# Patient Record
Sex: FEMALE | Race: WHITE | NOT HISPANIC OR LATINO | URBAN - METROPOLITAN AREA
[De-identification: names, ages, dates, MRNs, and addresses within clinical notes are randomized per-mention and may not be internally consistent; named-entity substitution may affect disease eponyms.]

---
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
## 2022-02-22 ENCOUNTER — TRANSCRIPTION ENCOUNTER (OUTPATIENT)
Age: 22
End: 2022-02-22

## 2022-05-24 ENCOUNTER — NON-APPOINTMENT (OUTPATIENT)
Age: 22
End: 2022-05-24

## 2022-05-26 ENCOUNTER — NON-APPOINTMENT (OUTPATIENT)
Age: 22
End: 2022-05-26

## 2022-06-03 ENCOUNTER — NON-APPOINTMENT (OUTPATIENT)
Age: 22
End: 2022-06-03

## 2022-10-24 ENCOUNTER — NON-APPOINTMENT (OUTPATIENT)
Age: 22
End: 2022-10-24

## 2023-02-07 PROBLEM — Z00.00 ENCOUNTER FOR PREVENTIVE HEALTH EXAMINATION: Status: ACTIVE | Noted: 2023-02-07

## 2023-02-07 PROBLEM — R73.03 PREDIABETES: Chronic | Status: ACTIVE | Noted: 2017-09-20

## 2023-02-07 PROBLEM — E28.2 POLYCYSTIC OVARIAN SYNDROME: Chronic | Status: ACTIVE | Noted: 2017-09-20

## 2023-03-07 ENCOUNTER — NON-APPOINTMENT (OUTPATIENT)
Age: 23
End: 2023-03-07

## 2023-03-07 ENCOUNTER — APPOINTMENT (OUTPATIENT)
Dept: NEUROLOGY | Facility: CLINIC | Age: 23
End: 2023-03-07
Payer: COMMERCIAL

## 2023-03-07 VITALS
SYSTOLIC BLOOD PRESSURE: 122 MMHG | HEIGHT: 67 IN | DIASTOLIC BLOOD PRESSURE: 88 MMHG | WEIGHT: 280 LBS | BODY MASS INDEX: 43.95 KG/M2 | HEART RATE: 87 BPM | TEMPERATURE: 97.8 F

## 2023-03-07 DIAGNOSIS — G43.701 CHRONIC MIGRAINE W/OUT AURA, NOT INTRACTABLE, WITH STATUS MIGRAINOSUS: ICD-10-CM

## 2023-03-07 DIAGNOSIS — Z82.0 FAMILY HISTORY OF EPILEPSY AND OTHER DISEASES OF THE NERVOUS SYSTEM: ICD-10-CM

## 2023-03-07 DIAGNOSIS — R51.9 HEADACHE, UNSPECIFIED: ICD-10-CM

## 2023-03-07 PROCEDURE — 99204 OFFICE O/P NEW MOD 45 MIN: CPT

## 2023-03-07 RX ORDER — BUPROPION HYDROCHLORIDE 200 MG/1
200 TABLET, FILM COATED, EXTENDED RELEASE ORAL TWICE DAILY
Refills: 0 | Status: ACTIVE | COMMUNITY

## 2023-03-07 RX ORDER — SERTRALINE HYDROCHLORIDE 50 MG/1
50 TABLET, FILM COATED ORAL DAILY
Refills: 0 | Status: ACTIVE | COMMUNITY

## 2023-03-07 RX ORDER — SUMATRIPTAN 50 MG/1
50 TABLET, FILM COATED ORAL
Refills: 0 | Status: ACTIVE | COMMUNITY

## 2023-03-07 NOTE — HISTORY OF PRESENT ILLNESS
[FreeTextEntry1] : 23-year-old woman, right-handed without significant medical history, complaining of recurrent headaches.  The headaches are described as moderate to severe intensity, usually bilateral temporal areas, starts with pressure can be throbbing, can occur at night can occur during the day can wake her up from sleep.  No clear association with his menstrual cycle, instead of mild, hunger time of day, weather pattern.  Over the last month and a half the headaches have become more frequent now almost daily.  Presently prescribed sumatriptan 50 mg which usually partial relief.  In the past has tried Tylenol, ibuprofen, naproxen which is probably helpful.\par No history of head injury or trauma, no associated symptoms of loss of vision double vision, confusion disorientation, trouble speaking, unilateral weakness or numbness of the face or extremities.\par No history of high blood pressure, heart disease, lung disease or malignancy.\par Reports 1 sister younger than her, with severe migraines, has tried multiple preventative as well as rescue therapies.  Presently on Aimovig and Botox. [Chronic Headache] : chronic headache [Aura] : no aura [Dizziness] : dizziness [Nausea] : nausea [Photophobia] : photophobia [Phonophobia] : phonophobia [Neck Pain] : neck pain [Scotoma] : no scotoma [Numbness] : no numbness [Tingling] : no tingling [Weakness] : no weakness [> 15 days per month] : > 15 days per month [> 4 hours] : > 4 hours [stayed the same] : stayed the same [5] : a current pain level of 5/10 [3] : a minimum pain level of 3/10 [9] : a maximum pain level of 9/10 [Increased] : Overall, patient's activity level has increased [Worsened] : The patient reports ~his/her~ symptoms since the last visit have worsened

## 2023-03-07 NOTE — PHYSICAL EXAM
[General Appearance - Alert] : alert [General Appearance - In No Acute Distress] : in no acute distress [Oriented To Time, Place, And Person] : oriented to person, place, and time [Impaired Insight] : insight and judgment were intact [Affect] : the affect was normal [Person] : oriented to person [Place] : oriented to place [Time] : oriented to time [Concentration Intact] : normal concentrating ability [Visual Intact] : visual attention was ~T not ~L decreased [Naming Objects] : no difficulty naming common objects [Repeating Phrases] : no difficulty repeating a phrase [Writing A Sentence] : no difficulty writing a sentence [Fluency] : fluency intact [Comprehension] : comprehension intact [Reading] : reading intact [Past History] : adequate knowledge of personal past history [Cranial Nerves Optic (II)] : visual acuity intact bilaterally,  visual fields full to confrontation, pupils equal round and reactive to light [Cranial Nerves Oculomotor (III)] : extraocular motion intact [Cranial Nerves Trigeminal (V)] : facial sensation intact symmetrically [Cranial Nerves Facial (VII)] : face symmetrical [Cranial Nerves Vestibulocochlear (VIII)] : hearing was intact bilaterally [Cranial Nerves Glossopharyngeal (IX)] : tongue and palate midline [Cranial Nerves Accessory (XI - Cranial And Spinal)] : head turning and shoulder shrug symmetric [Cranial Nerves Hypoglossal (XII)] : there was no tongue deviation with protrusion [Motor Tone] : muscle tone was normal in all four extremities [Motor Strength] : muscle strength was normal in all four extremities [No Muscle Atrophy] : normal bulk in all four extremities [Motor Handedness Right-Handed] : the patient is right hand dominant [Paresis Pronator Drift Right-Sided] : no pronator drift on the right [Paresis Pronator Drift Left-Sided] : no pronator drift on the left [Sensation Tactile Decrease] : light touch was intact [Abnormal Walk] : normal gait [Balance] : balance was intact [Past-pointing] : there was no past-pointing [Tremor] : no tremor present [Dysdiadochokinesia Bilaterally] : not present [Coordination - Dysmetria Impaired Finger-to-Nose Bilateral] : not present [2+] : Ankle jerk left 2+ [Sclera] : the sclera and conjunctiva were normal [PERRL With Normal Accommodation] : pupils were equal in size, round, reactive to light, with normal accommodation [Extraocular Movements] : extraocular movements were intact [Full Visual Field] : full visual field [Outer Ear] : the ears and nose were normal in appearance [Hearing Threshold Finger Rub Not Swain] : hearing was normal [Neck Appearance] : the appearance of the neck was normal [] : the neck was supple [Neck Cervical Mass (___cm)] : no neck mass was observed [Arterial Pulses Carotid] : carotid pulses were normal with no bruits

## 2023-03-16 ENCOUNTER — APPOINTMENT (OUTPATIENT)
Dept: NEUROLOGY | Facility: CLINIC | Age: 23
End: 2023-03-16
Payer: COMMERCIAL

## 2023-03-16 PROCEDURE — 95816 EEG AWAKE AND DROWSY: CPT

## 2023-03-27 ENCOUNTER — APPOINTMENT (OUTPATIENT)
Dept: MRI IMAGING | Facility: CLINIC | Age: 23
End: 2023-03-27
Payer: COMMERCIAL

## 2023-03-27 ENCOUNTER — OUTPATIENT (OUTPATIENT)
Dept: OUTPATIENT SERVICES | Facility: HOSPITAL | Age: 23
LOS: 1 days | End: 2023-03-27
Payer: COMMERCIAL

## 2023-03-27 DIAGNOSIS — R51.9 HEADACHE, UNSPECIFIED: ICD-10-CM

## 2023-03-27 PROCEDURE — A9585: CPT

## 2023-03-27 PROCEDURE — 70553 MRI BRAIN STEM W/O & W/DYE: CPT

## 2023-03-27 PROCEDURE — 70553 MRI BRAIN STEM W/O & W/DYE: CPT | Mod: 26

## 2023-03-28 ENCOUNTER — NON-APPOINTMENT (OUTPATIENT)
Age: 23
End: 2023-03-28

## 2023-05-15 ENCOUNTER — RX RENEWAL (OUTPATIENT)
Age: 23
End: 2023-05-15

## 2023-09-19 ENCOUNTER — APPOINTMENT (OUTPATIENT)
Dept: NEUROLOGY | Facility: CLINIC | Age: 23
End: 2023-09-19

## 2023-11-14 ENCOUNTER — RX RENEWAL (OUTPATIENT)
Age: 23
End: 2023-11-14

## 2023-11-18 ENCOUNTER — NON-APPOINTMENT (OUTPATIENT)
Age: 23
End: 2023-11-18

## 2023-12-10 ENCOUNTER — NON-APPOINTMENT (OUTPATIENT)
Age: 23
End: 2023-12-10

## 2023-12-14 ENCOUNTER — RX RENEWAL (OUTPATIENT)
Age: 23
End: 2023-12-14

## 2024-01-12 ENCOUNTER — RX RENEWAL (OUTPATIENT)
Age: 24
End: 2024-01-12

## 2024-01-26 ENCOUNTER — APPOINTMENT (OUTPATIENT)
Dept: NEUROLOGY | Facility: CLINIC | Age: 24
End: 2024-01-26
Payer: COMMERCIAL

## 2024-01-26 VITALS
HEIGHT: 67 IN | DIASTOLIC BLOOD PRESSURE: 85 MMHG | BODY MASS INDEX: 44.73 KG/M2 | WEIGHT: 285 LBS | TEMPERATURE: 97.6 F | SYSTOLIC BLOOD PRESSURE: 138 MMHG | HEART RATE: 85 BPM

## 2024-01-26 PROCEDURE — G2211 COMPLEX E/M VISIT ADD ON: CPT

## 2024-01-26 PROCEDURE — 99214 OFFICE O/P EST MOD 30 MIN: CPT

## 2024-01-26 RX ORDER — VERAPAMIL HYDROCHLORIDE 100 MG/1
100 CAPSULE, EXTENDED RELEASE ORAL
Qty: 30 | Refills: 5 | Status: ACTIVE | COMMUNITY
Start: 2023-03-07 | End: 1900-01-01

## 2024-01-26 RX ORDER — SUMATRIPTAN 100 MG/1
100 TABLET, FILM COATED ORAL
Qty: 12 | Refills: 5 | Status: ACTIVE | COMMUNITY
Start: 2023-03-07 | End: 1900-01-01

## 2024-01-26 NOTE — DISCUSSION/SUMMARY
[FreeTextEntry1] : 24-year-old woman history of chronic migraines, excellent response to verapamil  mg daily. Sumatriptan 100 mg, continue to be very effective in relieving her pain. Reviewed and discussed treatment, no change at this time. Patient will call with any changes. Return to office, 6 months.

## 2024-01-26 NOTE — HISTORY OF PRESENT ILLNESS
[FreeTextEntry1] : 24-year-old woman right-handed history of migraine headaches, here for follow-up.  Last seen March 2023.  At that time started on verapamil  mg at night, also provided sumatriptan 100 mg p.o. as needed headache. She has noticed significant reduction of her headaches.  At the time we saw her, she was having around 15 or more headaches a month, now since she has been on verapamil, she notes about 1 or 2 headaches a month.  Usually triggered by stress, weather patterns. Denies any difficulty verapamil, no dizziness, no chest pain or palpitation.  No constipation, no mood changes. The sumatriptan 100 mg usually works quite well for relief of her pain, without any side effects.

## 2024-01-26 NOTE — DATA REVIEWED
[de-identified] :   	 EXAM: [id number] - MR BRAIN WAW IC  - ORDERED BY: TYE WHITE   PROCEDURE DATE:  03/27/2023    INTERPRETATION:  CLINICAL INDICATIONS: worsening headaches, r/o SOL;  COMPARISON: None.  TECHNIQUE: MRI brain: Multiplanar, multisequence MR imaging of the brain are obtained with and without the administration of 12.5 cc intravenous Gadavist contrast. 2.5 cc of contrast was discarded  FINDINGS: Visualized internal auditory canals are unremarkable. Cranial nerves VII and VIII are unremarkable. The vestibulocochlear complexes are symmetric. No IAC mass. No abnormal leptomeningeal or parenchymal enhancement. Consider CT of the temporal bones as clinically warranted. There is no abnormal restricted diffusion to suggest acute infarction. No abnormal signal is demonstrated throughout the brain parenchyma. Normal T2 flow-voids are seen within  the intracranial vasculature. The lateral ventricles and cortical sulci are age-appropriate in size and configuration. There is no mass, mass effect, or extra-axial fluid collection. There is no susceptibility artifact to suggest hemorrhage. Midline structures are normal.  The visualized paranasal sinuses, mastoid air cells and orbits are unremarkable.   IMPRESSION: Unremarkable MRI of the brain and IACs.  [de-identified] :     Normal awake and drowsy EEG.    Signatures      Electronically signed by : TYE WHITE MD; Mar 21 2023  3:48PM EST (Author)

## 2024-07-23 ENCOUNTER — APPOINTMENT (OUTPATIENT)
Dept: NEUROLOGY | Facility: CLINIC | Age: 24
End: 2024-07-23

## 2024-08-20 ENCOUNTER — NON-APPOINTMENT (OUTPATIENT)
Age: 24
End: 2024-08-20

## 2024-08-20 ENCOUNTER — APPOINTMENT (OUTPATIENT)
Dept: FAMILY MEDICINE | Facility: CLINIC | Age: 24
End: 2024-08-20
Payer: COMMERCIAL

## 2024-08-20 VITALS
HEIGHT: 67 IN | TEMPERATURE: 97.9 F | BODY MASS INDEX: 45.99 KG/M2 | DIASTOLIC BLOOD PRESSURE: 80 MMHG | SYSTOLIC BLOOD PRESSURE: 120 MMHG | OXYGEN SATURATION: 98 % | HEART RATE: 81 BPM | WEIGHT: 293 LBS

## 2024-08-20 PROCEDURE — 99204 OFFICE O/P NEW MOD 45 MIN: CPT

## 2024-08-20 NOTE — HISTORY OF PRESENT ILLNESS
[FreeTextEntry8] : JOSE M MONIQUE is a 24 year old female presenting to [facility] care.

## 2024-08-28 ENCOUNTER — APPOINTMENT (OUTPATIENT)
Dept: FAMILY MEDICINE | Facility: CLINIC | Age: 24
End: 2024-08-28
Payer: COMMERCIAL

## 2024-08-28 VITALS
HEART RATE: 75 BPM | TEMPERATURE: 97.7 F | OXYGEN SATURATION: 96 % | HEIGHT: 67 IN | BODY MASS INDEX: 45.99 KG/M2 | SYSTOLIC BLOOD PRESSURE: 110 MMHG | WEIGHT: 293 LBS | DIASTOLIC BLOOD PRESSURE: 72 MMHG

## 2024-08-28 DIAGNOSIS — G43.701 CHRONIC MIGRAINE W/OUT AURA, NOT INTRACTABLE, WITH STATUS MIGRAINOSUS: ICD-10-CM

## 2024-08-28 DIAGNOSIS — Z00.00 ENCOUNTER FOR GENERAL ADULT MEDICAL EXAMINATION W/OUT ABNORMAL FINDINGS: ICD-10-CM

## 2024-08-28 DIAGNOSIS — Z23 ENCOUNTER FOR IMMUNIZATION: ICD-10-CM

## 2024-08-28 DIAGNOSIS — F41.9 ANXIETY DISORDER, UNSPECIFIED: ICD-10-CM

## 2024-08-28 DIAGNOSIS — F32.A ANXIETY DISORDER, UNSPECIFIED: ICD-10-CM

## 2024-08-28 PROCEDURE — 90686 IIV4 VACC NO PRSV 0.5 ML IM: CPT

## 2024-08-28 PROCEDURE — 92552 PURE TONE AUDIOMETRY AIR: CPT

## 2024-08-28 PROCEDURE — G0008: CPT

## 2024-08-28 PROCEDURE — 99395 PREV VISIT EST AGE 18-39: CPT | Mod: 25

## 2024-08-28 PROCEDURE — 36415 COLL VENOUS BLD VENIPUNCTURE: CPT

## 2024-08-28 PROCEDURE — 81003 URINALYSIS AUTO W/O SCOPE: CPT | Mod: QW

## 2024-08-28 NOTE — PHYSICAL EXAM
[No Acute Distress] : no acute distress [Well Nourished] : well nourished [Well Developed] : well developed [Well-Appearing] : well-appearing [Normal Sclera/Conjunctiva] : normal sclera/conjunctiva [PERRL] : pupils equal round and reactive to light [EOMI] : extraocular movements intact [Normal Outer Ear/Nose] : the outer ears and nose were normal in appearance [Normal Oropharynx] : the oropharynx was normal [No JVD] : no jugular venous distention [No Lymphadenopathy] : no lymphadenopathy [Supple] : supple [Thyroid Normal, No Nodules] : the thyroid was normal and there were no nodules present [No Respiratory Distress] : no respiratory distress  [No Accessory Muscle Use] : no accessory muscle use [Clear to Auscultation] : lungs were clear to auscultation bilaterally [Normal Rate] : normal rate  [Regular Rhythm] : with a regular rhythm [Normal S1, S2] : normal S1 and S2 [No Murmur] : no murmur heard [No Carotid Bruits] : no carotid bruits [No Abdominal Bruit] : a ~M bruit was not heard ~T in the abdomen [No Varicosities] : no varicosities [Pedal Pulses Present] : the pedal pulses are present [No Edema] : there was no peripheral edema [No Palpable Aorta] : no palpable aorta [No Extremity Clubbing/Cyanosis] : no extremity clubbing/cyanosis [Soft] : abdomen soft [Non Tender] : non-tender [Non-distended] : non-distended [No Masses] : no abdominal mass palpated [No HSM] : no HSM [Normal Bowel Sounds] : normal bowel sounds [Normal Posterior Cervical Nodes] : no posterior cervical lymphadenopathy [Normal Anterior Cervical Nodes] : no anterior cervical lymphadenopathy [No CVA Tenderness] : no CVA  tenderness [No Spinal Tenderness] : no spinal tenderness [No Joint Swelling] : no joint swelling [Grossly Normal Strength/Tone] : grossly normal strength/tone [No Rash] : no rash [Coordination Grossly Intact] : coordination grossly intact [No Focal Deficits] : no focal deficits [Normal Gait] : normal gait [Deep Tendon Reflexes (DTR)] : deep tendon reflexes were 2+ and symmetric [Normal Affect] : the affect was normal [Normal Insight/Judgement] : insight and judgment were intact [FreeTextEntry1] : . Right Ear  0.5-50 1-30 2-25 4-20  Left Ear  0.5-45 1-30 2-20 4-20

## 2024-08-28 NOTE — HEALTH RISK ASSESSMENT
[No] : No [Monthly or less (1 pt)] : Monthly or less (1 point) [1 or 2 (0 pts)] : 1 or 2 (0 points) [Never (0 pts)] : Never (0 points) [Yes] : In the past 12 months have you used drugs other than those required for medical reasons? Yes [No falls in past year] : Patient reported no falls in the past year [0] : 1) Little interest or pleasure doing things: Not at all (0) [1] : 2) Feeling down, depressed, or hopeless for several days (1) [PHQ-2 Negative - No further assessment needed] : PHQ-2 Negative - No further assessment needed [Never] : Never [NO] : No [Patient reported PAP Smear was normal] : Patient reported PAP Smear was normal [HIV test declined] : HIV test declined [Hepatitis C test declined] : Hepatitis C test declined [With Family] : lives with family [# of Members in Household ___] :  household currently consist of [unfilled] member(s) [Employed] : employed [College] : College [Single] : single [Sexually Active] : sexually active [Feels Safe at Home] : Feels safe at home [Fully functional (bathing, dressing, toileting, transferring, walking, feeding)] : Fully functional (bathing, dressing, toileting, transferring, walking, feeding) [Fully functional (using the telephone, shopping, preparing meals, housekeeping, doing laundry, using] : Fully functional and needs no help or supervision to perform IADLs (using the telephone, shopping, preparing meals, housekeeping, doing laundry, using transportation, managing medications and managing finances) [Reports normal functional visual acuity (ie: able to read med bottle)] : Reports normal functional visual acuity [Smoke Detector] : smoke detector [Carbon Monoxide Detector] : carbon monoxide detector [Seat Belt] :  uses seat belt [Sunscreen] : uses sunscreen [Travel to Developing Areas] : travel to developing areas [Audit-CScore] : 1 [de-identified] : marijuana  [[id number]] : 1 [Change in mental status noted] : No change in mental status noted [Language] : denies difficulty with language [Behavior] : denies difficulty with behavior [Learning/Retaining New Information] : denies difficulty learning/retaining new information [Handling Complex Tasks] : denies difficulty handling complex tasks [Reasoning] : denies difficulty with reasoning [Spatial Ability and Orientation] : denies difficulty with spatial ability and orientation [Reports changes in hearing] : Reports no changes in hearing [Reports changes in vision] : Reports no changes in vision [Reports changes in dental health] : Reports no changes in dental health [TB Exposure] : is not being exposed to tuberculosis [Caregiver Concerns] : does not have caregiver concerns [PapSmearDate] : 06/23 [FreeTextEntry2] :

## 2024-09-01 LAB
25(OH)D3 SERPL-MCNC: 40 NG/ML
ALBUMIN SERPL ELPH-MCNC: 4.2 G/DL
ALP BLD-CCNC: 117 U/L
ALT SERPL-CCNC: 19 U/L
ANION GAP SERPL CALC-SCNC: 14 MMOL/L
APPEARANCE: CLEAR
AST SERPL-CCNC: 16 U/L
BACTERIA: ABNORMAL /HPF
BILIRUB SERPL-MCNC: 0.7 MG/DL
BILIRUB UR QL STRIP: NORMAL
BILIRUBIN URINE: NEGATIVE
BLOOD URINE: NEGATIVE
BUN SERPL-MCNC: 15 MG/DL
CALCIUM SERPL-MCNC: 9.4 MG/DL
CAST: 0 /LPF
CHLORIDE SERPL-SCNC: 102 MMOL/L
CHOLEST SERPL-MCNC: 173 MG/DL
CLARITY UR: CLEAR
CO2 SERPL-SCNC: 23 MMOL/L
COLLECTION METHOD: NORMAL
COLOR: YELLOW
CREAT SERPL-MCNC: 0.83 MG/DL
EGFR: 100 ML/MIN/1.73M2
EPITHELIAL CELLS: 7 /HPF
ESTIMATED AVERAGE GLUCOSE: 108 MG/DL
GLUCOSE QUALITATIVE U: NEGATIVE MG/DL
GLUCOSE SERPL-MCNC: 91 MG/DL
GLUCOSE UR-MCNC: NORMAL
HBA1C MFR BLD HPLC: 5.4 %
HCG UR QL: 0.2 EU/DL
HCT VFR BLD CALC: 41.9 %
HDLC SERPL-MCNC: 52 MG/DL
HGB BLD-MCNC: 12.9 G/DL
HGB UR QL STRIP.AUTO: NORMAL
KETONES UR-MCNC: NORMAL
KETONES URINE: NEGATIVE MG/DL
LDLC SERPL CALC-MCNC: 105 MG/DL
LEUKOCYTE ESTERASE UR QL STRIP: ABNORMAL
LEUKOCYTE ESTERASE URINE: ABNORMAL
MCHC RBC-ENTMCNC: 26.9 PG
MCHC RBC-ENTMCNC: 30.8 GM/DL
MCV RBC AUTO: 87.3 FL
MICROSCOPIC-UA: NORMAL
NITRITE UR QL STRIP: NORMAL
NITRITE URINE: NEGATIVE
NONHDLC SERPL-MCNC: 120 MG/DL
PH UR STRIP: 7.5
PH URINE: 7.5
PLATELET # BLD AUTO: 376 K/UL
POTASSIUM SERPL-SCNC: 4.5 MMOL/L
PROT SERPL-MCNC: 6.9 G/DL
PROT UR STRIP-MCNC: NORMAL
PROTEIN URINE: NORMAL MG/DL
RBC # BLD: 4.8 M/UL
RBC # FLD: 14.7 %
RED BLOOD CELLS URINE: 2 /HPF
SODIUM SERPL-SCNC: 139 MMOL/L
SP GR UR STRIP: 1.02
SPECIFIC GRAVITY URINE: 1.02
T3FREE SERPL-MCNC: 3.18 PG/ML
T4 FREE SERPL-MCNC: 1.2 NG/DL
TRIGL SERPL-MCNC: 81 MG/DL
TSH SERPL-ACNC: 1.8 UIU/ML
UROBILINOGEN URINE: 0.2 MG/DL
WBC # FLD AUTO: 5.64 K/UL
WHITE BLOOD CELLS URINE: 0 /HPF

## 2024-09-18 ENCOUNTER — TRANSCRIPTION ENCOUNTER (OUTPATIENT)
Age: 24
End: 2024-09-18

## 2024-10-05 ENCOUNTER — NON-APPOINTMENT (OUTPATIENT)
Age: 24
End: 2024-10-05